# Patient Record
(demographics unavailable — no encounter records)

---

## 2024-10-08 NOTE — ASSESSMENT
[FreeTextEntry1] : No evidence of lower airways disease patient would like to be tested for mold exposure will check IgE antibodies CBC and total IgE level  Needs reassessment of sleep apnea will arrange home sleep study- should defer this for 1 month until acute symptoms resolve  Is concerned about nasal septal deviation.  Will refer back to ENT after home sleep study
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
## 2024-10-08 NOTE — REVIEW OF SYSTEMS
[Cough] : cough [Chest Discomfort] : chest discomfort [Negative] : Endocrine [Dyspnea] : no dyspnea [TextBox_14] : rhinorrhea  [TextBox_30] : chest congestion

## 2024-10-08 NOTE — PHYSICAL EXAM
[No Acute Distress] : no acute distress [Normal Appearance] : normal appearance [No Neck Mass] : no neck mass [Normal Rate/Rhythm] : normal rate/rhythm [Normal S1, S2] : normal s1, s2 [No Murmurs] : no murmurs [No Resp Distress] : no resp distress [Clear to Auscultation Bilaterally] : clear to auscultation bilaterally [No Abnormalities] : no abnormalities [Benign] : benign [Normal Gait] : normal gait [No Clubbing] : no clubbing [No Cyanosis] : no cyanosis [No Edema] : no edema [FROM] : FROM [Normal Color/ Pigmentation] : normal color/ pigmentation [No Focal Deficits] : no focal deficits [Oriented x3] : oriented x3 [Normal Affect] : normal affect [Nasal congestion] : nasal congestion [TextBox_11] : Nasal septal deviation

## 2024-10-08 NOTE — REASON FOR VISIT
[Follow-Up] : a follow-up visit [Sleep Evaluation] : sleep evaluation [TextBox_44] : possible mold exposure

## 2024-10-21 NOTE — DISCUSSION/SUMMARY
[FreeTextEntry1] : In summary, the patient is a 51-year-old gentleman with a history of symptomatic recurrent persistent atrial fibrillation who ultimately underwent a catheter ablation on June 4, 2020, which included PVI and significant substrate modification. He subsequently developed an atypical atrial flutter with reduction of his Sotalol and underwent an atypical atrial flutter ablation with termination of his atrial flutter with ablation in the postero-septum with completion of the septal line as well as redo PVI on February 25, 2021. He remains in sinus rhythm on Sotalol with no recurrent symptoms.  Overall he continues to do well and And given his weight loss and overall improved health status we discussed the options of either continuing the sotalol or discontinuing it to see what happens.  The patient is in favor of trying to come off it which I think is reasonable.  He is going to discontinue the sotalol and monitor himself on a daily basis regularly with his Kardia device. [EKG obtained to assist in diagnosis and management of assessed problem(s)] : EKG obtained to assist in diagnosis and management of assessed problem(s)

## 2024-10-21 NOTE — HISTORY OF PRESENT ILLNESS
[FreeTextEntry1] : I had the pleasure of seeing your patient Ranjan Desir today in the arrhythmia clinic of [facility]. As you well know, the patient is a delightful 51-year-old retired  and 9/11 , with history of persistent atrial fibrillation. The patient had previously presented with chest pain and underwent a catheterization which demonstrated normal coronary arteries. This was complicated by a femoral artery tear requiring repair. He has a history of hypertension and hyperlipidemia as well as sleep apnea. On routine follow-up the patient was found to be in atrial fibrillation onset on September 23, 2019. He underwent a successful CLARY with cardioversion in October 2020. His LVEF on the CLARY was 40%. Unfortunately, he had recurrence of atrial fibrillation by January 2020. He subsequently underwent catheter ablation on 6/2/2020. This included a PVI, a mitral, roof and septal line, and CTI. He was also begun on sotalol.   Echocardiogram: September 23, 2019 normal left ventricular size and function with an ejection fraction of 55%. Left atrium was 3.9 cm and there was trace mitral regurgitation. CLARY: October 30, 2019. This demonstrated mild LVH with an ejection fraction of 40%, minimal MR and some right atrial and RV enlargement. The left atrium size was 4.5 cm. Echocardiogram: September 9, 2020: Normal LV function with EF 63%. Trace mitral and tricuspid regurgitation and trace aortic and pulmonary insufficiency. Mild left atrial enlargement.  In follow-up the patient was not having any further arrhythmias but was potentially having side effects from the Sotalol. We therefore decreased the Sotolol to 40mg BID. He reported that over the next couple of weeks since decreasing Sotalol, he was feeling his heart racing at times. He was seen in clinic (Dr. Malave) and was found to be in an atypical atrial flutter at around 150BPM. His Sotalol was increased back to 80mg BID. He underwent an atypical atrial flutter ablation on 2/25/21 The atrial flutter terminated with ablation in the postero-septum with completion of the septal line and a repeat PVI was performed due to reconnection of the LS/LI/RI PVs.   A 2 week [employer]optach monitor from May 2021 showed predominantly sinus rhythm with average heart rates of 83 bpm and infrequent brief PAT with longest lasting 14 beats and with an SVT burden of <1%. His Eliquis was discontinued based on these findings. He has since maintained sinus rhythm on Sotalol.    He underwent surgery for deviated nasal septum which has improved his sleep apnea as well as some weight loss. He is still attempting to lose more weight which was recommended by his pulmonologist which may ultimately resolve his sleep apnea. His last sleep study reports mild obstructive sleep apnea. The patient was begun on Mounjaro and has lost over 40 pounds.  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  He returns to clinic today for follow-up.  Overall, he is doing quite well.  He denies any palpitations, lightheadedness, presyncope or syncope.  He just completed a home sleep study which she turned in and the results are not yet available.  His blood pressure has come down significantly with weight loss.  The patient does have a Kardia device, but he has not used it regularly in a while. Today in clinic his blood pressure is 114/81 and his pulse was 88 and regular.  His EKG was sinus rhythm at 84 bpm.

## 2024-11-04 NOTE — PROCEDURE
[FreeTextEntry1] : Home sleep study demonstrates severe positional sleep apnea severe LOU in supine position, nonsupine AHI normal

## 2024-11-04 NOTE — REASON FOR VISIT
[Follow-Up] : a follow-up visit
[Follow-Up] : a follow-up visit
  Please monitor for carbamazepine toxicity, and the drug to drug interaction of fluconazole and tegretol    BATHING: Please do not submerge wound underwater. You may shower and/or sponge bathe.   ACTIVITY: No heavy lifting or straining. Otherwise, you may return to your usual level of physical activity. If you are taking narcotic pain medication (such as Percocet) DO NOT drive a car, operate machinery or make important decisions.  DIET: Low fiber diet recommended  NOTIFY YOUR SURGEON IF: You have any bleeding that does not stop, any pus draining from your wound(s), any fever (over 100.4 F) or chills, persistent nausea/vomiting, persistent diarrhea, or if your pain is not controlled on your discharge pain medications.  FOLLOW-UP: Please follow up with your primary care physician and Acute Care Surgery clinic [phone] in 10-14 days regarding your hospitalization. Call for appointment upon discharge.

## 2024-11-04 NOTE — ASSESSMENT
[FreeTextEntry1] : Treatment options discussed including CPAP oral appliance positional therapy and position sensor monitoring For now we will start with positional therapy and will investigate reimbursement for night shift position sensor

## 2024-11-04 NOTE — HISTORY OF PRESENT ILLNESS
[FreeTextEntry1] : Follow-up for sleep apnea Here to review results of study Continues to experience sleep symptoms when lying on his back

## 2025-02-24 NOTE — HISTORY OF PRESENT ILLNESS
[FreeTextEntry1] : 51 year old male presents to the office for fertility evaluation Currently on TRT weekly injections last injection Monday and anastrozole 0.5 weekly Partner 38yrs old (Have not begun trying to conceive as of yet) Recently had semen analysis completed at [facility] results sent to his urologist (states shows no sperm) Had ultrasound completed couple years ago which showed left varicocele he states  Neither he nor partner has hx of prior pregnancy  Had complications from angiogram requiring sx to femoral artery and hx of prostatitis   
[FreeTextEntry1] : 51 year old male presents to the office for fertility evaluation Currently on TRT weekly injections last injection Monday and anastrozole 0.5 weekly Partner 38yrs old (Have not begun trying to conceive as of yet) Recently had semen analysis completed at [facility] results sent to his urologist (states shows no sperm) Had ultrasound completed couple years ago which showed left varicocele he states  Neither he nor partner has hx of prior pregnancy  Had complications from angiogram requiring sx to femoral artery and hx of prostatitis   
negative - no chest pain

## 2025-02-24 NOTE — PHYSICAL EXAM
[General Appearance - Well Developed] : well developed [General Appearance - Well Nourished] : well nourished [Bowel Sounds] : normal bowel sounds [Abdomen Soft] : soft

## 2025-05-13 NOTE — HISTORY OF PRESENT ILLNESS
[FreeTextEntry1] : here 8 weeks after his last TRT injection to review results and discuss options    previously 51 year old male presents to the office for fertility evaluation Currently on TRT weekly injections last injection Monday and anastrozole 0.5 weekly Partner 38yrs old (Have not begun trying to conceive as of yet) Recently had semen analysis completed at [facility] results sent to his urologist (states shows no sperm) Had ultrasound completed couple years ago which showed left varicocele he states  Neither he nor partner has hx of prior pregnancy  Had complications from angiogram requiring sx to femoral artery and hx of prostatitis

## 2025-05-13 NOTE — HISTORY OF PRESENT ILLNESS
[FreeTextEntry1] : here 8 weeks after his last TRT injection to review results and discuss options    previously 51 year old male presents to the office for fertility evaluation Currently on TRT weekly injections last injection Monday and anastrozole 0.5 weekly Partner 38yrs old (Have not begun trying to conceive as of yet) Recently had semen analysis completed at [facility] results sent to his urologist (states shows no sperm) Had ultrasound completed couple years ago which showed left varicocele he states  Neither he nor partner has hx of prior pregnancy  Had complications from angiogram requiring sx to femoral artery and hx of prostatitis

## 2025-05-13 NOTE — PHYSICAL EXAM
[General Appearance - Well Developed] : well developed [Oriented To Time, Place, And Person] : oriented to person, place, and time [General Appearance - Well Nourished] : well nourished

## 2025-06-04 NOTE — HISTORY OF PRESENT ILLNESS
[FreeTextEntry1] : I had the pleasure of seeing your patient Ranjan Desir today in the arrhythmia clinic of [facility]. As you well know, the patient is a delightful 52-year-old retired  and 9/11 , with history of persistent atrial fibrillation. The patient had previously presented with chest pain and underwent a catheterization which demonstrated normal coronary arteries. This was complicated by a femoral artery tear requiring repair. He has a history of hypertension and hyperlipidemia as well as sleep apnea. On routine follow-up the patient was found to be in atrial fibrillation onset on September 23, 2019. He underwent a successful CLARY with cardioversion in October 2020. His LVEF on the CLARY was 40%. Unfortunately, he had recurrence of atrial fibrillation by January 2020. He subsequently underwent catheter ablation on 6/2/2020. This included a PVI, a mitral, roof and septal line, and CTI. He was also begun on sotalol.   Echocardiogram: September 23, 2019 normal left ventricular size and function with an ejection fraction of 55%. Left atrium was 3.9 cm and there was trace mitral regurgitation. CLARY: October 30, 2019. This demonstrated mild LVH with an ejection fraction of 40%, minimal MR and some right atrial and RV enlargement. The left atrium size was 4.5 cm. Echocardiogram: September 9, 2020: Normal LV function with EF 63%. Trace mitral and tricuspid regurgitation and trace aortic and pulmonary insufficiency. Mild left atrial enlargement.  In follow-up the patient was not having any further arrhythmias but was potentially having side effects from the Sotalol. We therefore decreased the Sotolol to 40mg BID. He reported that over the next couple of weeks since decreasing Sotalol, he was feeling his heart racing at times. He was seen in clinic (Dr. Malave) and was found to be in an atypical atrial flutter at around 150BPM. His Sotalol was increased back to 80mg BID. He underwent an atypical atrial flutter ablation on 2/25/21 The atrial flutter terminated with ablation in the postero-septum with completion of the septal line and a repeat PVI was performed due to reconnection of the LS/LI/RI PVs.   A 2 week [employer]pt[employer] monitor from May 2021 showed predominantly sinus rhythm with average heart rates of 83 bpm and infrequent brief PAT with longest lasting 14 beats and with an SVT burden of <1%. His Eliquis was discontinued based on these findings. He has since maintained sinus rhythm on Sotalol.    He underwent surgery for deviated nasal septum which has improved his sleep apnea as well as some weight loss. He is still attempting to lose more weight which was recommended by his pulmonologist which may ultimately resolve his sleep apnea. His last sleep study reports mild obstructive sleep apnea. The patient was begun on Mounjaro and has lost over 40 pounds.   He returns to clinic today for follow-up.  Overall, he is doing quite well.  The patient had reported that he had plateaued in his weight loss and his dose of Mounjaro and phentermine were increased.  With this he was not eating or drinking.  On February 11 he noticed his heart racing and his Apple Watch read his heart rate between 120 and 130 5 bpm.  He came to the emergency room 3 room and an EKG from that visit suggested sinus tachycardia 120 bpm.  With rehydration his heart rate slowed.  Overall he denies besides this episode any palpitations, lightheadedness, presyncope or syncope.  He his blood pressure today was 115/81 with a pulse of 82 that was regular.  His EKG revealed sinus rhythm with an RSR prime pattern at 86 bpm.

## 2025-06-04 NOTE — HISTORY OF PRESENT ILLNESS
[FreeTextEntry1] : I had the pleasure of seeing your patient Ranjan Desir today in the arrhythmia clinic of [facility]. As you well know, the patient is a delightful 52-year-old retired  and 9/11 , with history of persistent atrial fibrillation. The patient had previously presented with chest pain and underwent a catheterization which demonstrated normal coronary arteries. This was complicated by a femoral artery tear requiring repair. He has a history of hypertension and hyperlipidemia as well as sleep apnea. On routine follow-up the patient was found to be in atrial fibrillation onset on September 23, 2019. He underwent a successful CLARY with cardioversion in October 2020. His LVEF on the CLARY was 40%. Unfortunately, he had recurrence of atrial fibrillation by January 2020. He subsequently underwent catheter ablation on 6/2/2020. This included a PVI, a mitral, roof and septal line, and CTI. He was also begun on sotalol.   Echocardiogram: September 23, 2019 normal left ventricular size and function with an ejection fraction of 55%. Left atrium was 3.9 cm and there was trace mitral regurgitation. CLARY: October 30, 2019. This demonstrated mild LVH with an ejection fraction of 40%, minimal MR and some right atrial and RV enlargement. The left atrium size was 4.5 cm. Echocardiogram: September 9, 2020: Normal LV function with EF 63%. Trace mitral and tricuspid regurgitation and trace aortic and pulmonary insufficiency. Mild left atrial enlargement.  In follow-up the patient was not having any further arrhythmias but was potentially having side effects from the Sotalol. We therefore decreased the Sotolol to 40mg BID. He reported that over the next couple of weeks since decreasing Sotalol, he was feeling his heart racing at times. He was seen in clinic (Dr. Malave) and was found to be in an atypical atrial flutter at around 150BPM. His Sotalol was increased back to 80mg BID. He underwent an atypical atrial flutter ablation on 2/25/21 The atrial flutter terminated with ablation in the postero-septum with completion of the septal line and a repeat PVI was performed due to reconnection of the LS/LI/RI PVs.   A 2 week [employer] monitor from May 2021 showed predominantly sinus rhythm with average heart rates of 83 bpm and infrequent brief PAT with longest lasting 14 beats and with an SVT burden of <1%. His Eliquis was discontinued based on these findings. He has since maintained sinus rhythm on Sotalol.    He underwent surgery for deviated nasal septum which has improved his sleep apnea as well as some weight loss. He is still attempting to lose more weight which was recommended by his pulmonologist which may ultimately resolve his sleep apnea. His last sleep study reports mild obstructive sleep apnea. The patient was begun on Mounjaro and has lost over 40 pounds.   He returns to clinic today for follow-up.  Overall, he is doing quite well.  The patient had reported that he had plateaued in his weight loss and his dose of Mounjaro and phentermine were increased.  With this he was not eating or drinking.  On February 11 he noticed his heart racing and his [employer] Watch read his heart rate between 120 and 130 5 bpm.  He came to the emergency room 3 room and an EKG from that visit suggested sinus tachycardia 120 bpm.  With rehydration his heart rate slowed.  Overall he denies besides this episode any palpitations, lightheadedness, presyncope or syncope.  He his blood pressure today was 115/81 with a pulse of 82 that was regular.  His EKG revealed sinus rhythm with an RSR prime pattern at 86 bpm.

## 2025-06-04 NOTE — DISCUSSION/SUMMARY
[FreeTextEntry1] : In summary, the patient is a 52-year-old gentleman with a history of symptomatic recurrent persistent atrial fibrillation who ultimately underwent a catheter ablation on June 4, 2020, which included PVI and significant substrate modification. He subsequently developed an atypical atrial flutter with reduction of his Sotalol and underwent an atypical atrial flutter ablation with termination of his atrial flutter with ablation in the postero-septum with completion of the septal line as well as redo PVI on February 25, 2021. He remains in sinus rhythm on Sotalol with no recurrent symptoms.  Overall he continues to do well Off antiarrhythmic medications.  For now we will continue on as present and will be seeing him back in clinic in follow-up. [EKG obtained to assist in diagnosis and management of assessed problem(s)] : EKG obtained to assist in diagnosis and management of assessed problem(s)